# Patient Record
Sex: FEMALE | Race: WHITE | NOT HISPANIC OR LATINO | Employment: OTHER | URBAN - METROPOLITAN AREA
[De-identification: names, ages, dates, MRNs, and addresses within clinical notes are randomized per-mention and may not be internally consistent; named-entity substitution may affect disease eponyms.]

---

## 2017-03-09 ENCOUNTER — COMMUNICATION - HEALTHEAST (OUTPATIENT)
Dept: FAMILY MEDICINE | Facility: CLINIC | Age: 62
End: 2017-03-09

## 2017-03-09 DIAGNOSIS — J30.9 ALLERGIC RHINITIS: ICD-10-CM

## 2017-05-10 ENCOUNTER — HOSPITAL ENCOUNTER (OUTPATIENT)
Dept: CT IMAGING | Facility: CLINIC | Age: 62
Discharge: HOME | End: 2017-05-10
Attending: FAMILY MEDICINE

## 2017-05-10 ENCOUNTER — OFFICE VISIT - HEALTHEAST (OUTPATIENT)
Dept: FAMILY MEDICINE | Facility: CLINIC | Age: 62
End: 2017-05-10

## 2017-05-10 DIAGNOSIS — R10.9 ABDOMINAL PAIN: ICD-10-CM

## 2017-06-26 ENCOUNTER — COMMUNICATION - HEALTHEAST (OUTPATIENT)
Dept: FAMILY MEDICINE | Facility: CLINIC | Age: 62
End: 2017-06-26

## 2017-06-26 DIAGNOSIS — J30.9 ALLERGIC RHINITIS: ICD-10-CM

## 2017-10-03 ENCOUNTER — COMMUNICATION - HEALTHEAST (OUTPATIENT)
Dept: FAMILY MEDICINE | Facility: CLINIC | Age: 62
End: 2017-10-03

## 2017-10-04 ENCOUNTER — AMBULATORY - HEALTHEAST (OUTPATIENT)
Dept: FAMILY MEDICINE | Facility: CLINIC | Age: 62
End: 2017-10-04

## 2017-12-18 ENCOUNTER — OFFICE VISIT - HEALTHEAST (OUTPATIENT)
Dept: FAMILY MEDICINE | Facility: CLINIC | Age: 62
End: 2017-12-18

## 2017-12-18 ENCOUNTER — AMBULATORY - HEALTHEAST (OUTPATIENT)
Dept: FAMILY MEDICINE | Facility: CLINIC | Age: 62
End: 2017-12-18

## 2017-12-18 DIAGNOSIS — Z12.31 VISIT FOR SCREENING MAMMOGRAM: ICD-10-CM

## 2017-12-18 DIAGNOSIS — Z00.00 ROUTINE GENERAL MEDICAL EXAMINATION AT A HEALTH CARE FACILITY: ICD-10-CM

## 2017-12-18 LAB
CHOLEST SERPL-MCNC: 241 MG/DL
FASTING STATUS PATIENT QL REPORTED: YES
HDLC SERPL-MCNC: 73 MG/DL
LDLC SERPL CALC-MCNC: 154 MG/DL
TRIGL SERPL-MCNC: 72 MG/DL

## 2017-12-18 ASSESSMENT — MIFFLIN-ST. JEOR: SCORE: 1058.19

## 2018-02-28 ENCOUNTER — RECORDS - HEALTHEAST (OUTPATIENT)
Dept: ADMINISTRATIVE | Facility: OTHER | Age: 63
End: 2018-02-28

## 2018-02-28 ENCOUNTER — RECORDS - HEALTHEAST (OUTPATIENT)
Dept: BONE DENSITY | Facility: CLINIC | Age: 63
End: 2018-02-28

## 2018-02-28 ENCOUNTER — HOSPITAL ENCOUNTER (OUTPATIENT)
Dept: MAMMOGRAPHY | Facility: CLINIC | Age: 63
Discharge: HOME | End: 2018-02-28
Attending: FAMILY MEDICINE

## 2018-02-28 DIAGNOSIS — Z12.31 VISIT FOR SCREENING MAMMOGRAM: ICD-10-CM

## 2018-02-28 DIAGNOSIS — Z00.00 ENCOUNTER FOR GENERAL ADULT MEDICAL EXAMINATION WITHOUT ABNORMAL FINDINGS: ICD-10-CM

## 2018-03-07 ENCOUNTER — AMBULATORY - HEALTHEAST (OUTPATIENT)
Dept: FAMILY MEDICINE | Facility: CLINIC | Age: 63
End: 2018-03-07

## 2018-03-07 DIAGNOSIS — M81.0 OSTEOPOROSIS: ICD-10-CM

## 2018-03-08 ENCOUNTER — COMMUNICATION - HEALTHEAST (OUTPATIENT)
Dept: FAMILY MEDICINE | Facility: CLINIC | Age: 63
End: 2018-03-08

## 2018-03-09 ENCOUNTER — COMMUNICATION - HEALTHEAST (OUTPATIENT)
Dept: FAMILY MEDICINE | Facility: CLINIC | Age: 63
End: 2018-03-09

## 2018-04-24 ENCOUNTER — COMMUNICATION - HEALTHEAST (OUTPATIENT)
Dept: FAMILY MEDICINE | Facility: CLINIC | Age: 63
End: 2018-04-24

## 2018-04-24 DIAGNOSIS — J30.9 ALLERGIC RHINITIS: ICD-10-CM

## 2018-04-24 RX ORDER — OLOPATADINE HYDROCHLORIDE 2 MG/ML
SOLUTION OPHTHALMIC
Qty: 2.5 ML | Refills: 6 | Status: SHIPPED | OUTPATIENT
Start: 2018-04-24

## 2018-04-27 ENCOUNTER — COMMUNICATION - HEALTHEAST (OUTPATIENT)
Dept: FAMILY MEDICINE | Facility: CLINIC | Age: 63
End: 2018-04-27

## 2018-06-14 ENCOUNTER — OFFICE VISIT - HEALTHEAST (OUTPATIENT)
Dept: ENDOCRINOLOGY | Facility: CLINIC | Age: 63
End: 2018-06-14

## 2018-06-14 DIAGNOSIS — M81.0 SENILE OSTEOPOROSIS: ICD-10-CM

## 2018-06-14 LAB
CALCIUM SERPL-MCNC: 9.7 MG/DL (ref 8.5–10.5)
CREAT SERPL-MCNC: 0.71 MG/DL (ref 0.6–1.1)
GFR SERPL CREATININE-BSD FRML MDRD: >60 ML/MIN/1.73M2
POTASSIUM BLD-SCNC: 4 MMOL/L (ref 3.5–5)
PTH-INTACT SERPL-MCNC: 50 PG/ML (ref 10–86)
T4 FREE SERPL-MCNC: 0.9 NG/DL (ref 0.7–1.8)
TSH SERPL DL<=0.005 MIU/L-ACNC: 1.55 UIU/ML (ref 0.3–5)

## 2018-06-14 ASSESSMENT — MIFFLIN-ST. JEOR: SCORE: 1088.13

## 2018-06-15 LAB
25(OH)D3 SERPL-MCNC: 65.1 NG/ML (ref 30–80)
25(OH)D3 SERPL-MCNC: 65.1 NG/ML (ref 30–80)

## 2018-06-18 ENCOUNTER — COMMUNICATION - HEALTHEAST (OUTPATIENT)
Dept: FAMILY MEDICINE | Facility: CLINIC | Age: 63
End: 2018-06-18

## 2018-06-18 DIAGNOSIS — J30.9 ALLERGIC RHINITIS: ICD-10-CM

## 2018-06-20 RX ORDER — OLOPATADINE HYDROCHLORIDE 1 MG/ML
SOLUTION/ DROPS OPHTHALMIC
Qty: 15 ML | Refills: 0 | Status: SHIPPED | OUTPATIENT
Start: 2018-06-20

## 2018-08-01 ENCOUNTER — OFFICE VISIT - HEALTHEAST (OUTPATIENT)
Dept: FAMILY MEDICINE | Facility: CLINIC | Age: 63
End: 2018-08-01

## 2018-08-01 DIAGNOSIS — L72.9 CYST OF SKIN: ICD-10-CM

## 2020-01-30 ENCOUNTER — RECORDS - HEALTHEAST (OUTPATIENT)
Dept: ADMINISTRATIVE | Facility: OTHER | Age: 65
End: 2020-01-30

## 2020-07-13 ENCOUNTER — RECORDS - HEALTHEAST (OUTPATIENT)
Dept: BONE DENSITY | Facility: CLINIC | Age: 65
End: 2020-07-13

## 2020-07-13 ENCOUNTER — RECORDS - HEALTHEAST (OUTPATIENT)
Dept: ADMINISTRATIVE | Facility: OTHER | Age: 65
End: 2020-07-13

## 2020-07-13 DIAGNOSIS — Z78.0 ASYMPTOMATIC MENOPAUSAL STATE: ICD-10-CM

## 2020-07-13 DIAGNOSIS — Z13.820 ENCOUNTER FOR SCREENING FOR OSTEOPOROSIS: ICD-10-CM

## 2021-05-25 ENCOUNTER — RECORDS - HEALTHEAST (OUTPATIENT)
Dept: ADMINISTRATIVE | Facility: CLINIC | Age: 66
End: 2021-05-25

## 2021-05-26 ENCOUNTER — RECORDS - HEALTHEAST (OUTPATIENT)
Dept: ADMINISTRATIVE | Facility: CLINIC | Age: 66
End: 2021-05-26

## 2021-05-28 ENCOUNTER — RECORDS - HEALTHEAST (OUTPATIENT)
Dept: ADMINISTRATIVE | Facility: CLINIC | Age: 66
End: 2021-05-28

## 2021-05-29 ENCOUNTER — RECORDS - HEALTHEAST (OUTPATIENT)
Dept: ADMINISTRATIVE | Facility: CLINIC | Age: 66
End: 2021-05-29

## 2021-05-30 ENCOUNTER — RECORDS - HEALTHEAST (OUTPATIENT)
Dept: ADMINISTRATIVE | Facility: CLINIC | Age: 66
End: 2021-05-30

## 2021-05-31 VITALS — WEIGHT: 113.4 LBS | HEIGHT: 66 IN | BODY MASS INDEX: 18.23 KG/M2

## 2021-05-31 VITALS — BODY MASS INDEX: 20.02 KG/M2 | WEIGHT: 121.25 LBS

## 2021-06-01 VITALS — BODY MASS INDEX: 19.29 KG/M2 | WEIGHT: 120 LBS | HEIGHT: 66 IN

## 2021-06-01 VITALS — WEIGHT: 119 LBS | BODY MASS INDEX: 19.5 KG/M2

## 2021-06-10 NOTE — PROGRESS NOTES
61-year-old female presents with a dull constant pain of the right lower quadrant and right flank for 1 week.  She was seen at the [facility] clinic with a negative urine culture but noted to have trace blood in her urine.  Patient nonetheless completed 6 doses of Bactrim.  I discussed with the patient about differential diagnosis including nephrolithiasis, ovarian pathology, intestinal pathology, gynecologic pathology, and muscle skeletal.  I will obtain a CT scan.  I will repeat a urinalysis.  I will communicate the results to the patient in further management would depend on the results.  Patient verbalized understanding and agreed with the plan    ASSESSMENT/PLAN:  1. Abdominal pain  - Urinalysis  - CT Abdomen Pelvis Without Oral With Without IV Contrast; Future      Orders Placed This Encounter   Procedures     CT Abdomen Pelvis Without Oral With Without IV Contrast     Standing Status:   Future     Standing Expiration Date:   5/10/2018     Order Specific Question:   Can the procedure be changed per Radiologist protocol?     Answer:   Yes     Urinalysis           CHIEF COMPLAINT:  Chief Complaint   Patient presents with     Back Pain     lower back. Pt was in [facility] 5/6/17 did urine test     Groin Pain     lower right side       HISTORY OF PRESENT ILLNESS:  Yamilka is a 62 y.o. female presenting to the clinic today for back and abdominal pain. On 5/6/2017, she was seen a the [facility] for concerns for a UTI, and was prescribed a 3 day course of Bactrim. She finished them, but her urine culture was negative. She rates her pain at a 4/10. It has not limited her activities. She has pain in her right lower quadrant/right pelvic area and her right back and flank area. The pain is constant, and if she lays a certain way, she feels more pain. Her pain is dull and achy in quality. Denies any dysuria, burning with urination, urgency or frequency. She has not noticed any hematuria, but there was a trace of blood  in her urine at the [facility]. The groin pain and back happen simultaneously. She has never had nephrolithiasis before. She feels her pain has been unchanged since onset, not getting better or worse. Only surgery to the pelvic area was a tubal ligation. Her last colonoscopy was 3 years ago. She did have some rectal bleeding 2 weeks post her last colonoscopy, and was hospitalized for this. Notes some symptoms of vertigo in the past few months, but does not feel like this is connected to the pain.       REVIEW OF SYSTEMS:   No bowel changes, no vaginal concerns. No upper abdominal pain, chest pain, shortness of breath, nausea, vomiting, heartburn. Denies rash.  All other systems are negative.    PFSH:  Surgical history includes tubal ligation.     TOBACCO USE:  History   Smoking Status     Former Smoker   Smokeless Tobacco     Never Used       VITALS:  Vitals:    05/10/17 0955   BP: 100/70   Patient Site: Left Arm   Patient Position: Sitting   Cuff Size: Adult Regular   Pulse: 64   Weight: 121 lb 4 oz (55 kg)     Wt Readings from Last 3 Encounters:   05/10/17 121 lb 4 oz (55 kg)   12/13/16 126 lb 11.2 oz (57.5 kg)   04/28/16 125 lb 6.4 oz (56.9 kg)       PHYSICAL EXAM:  Constitutional: Patient is oriented to person, place, and time. Patient appears well-developed and well-nourished. No distress.   Head: Normocephalic and atraumatic.   Abdominal: Soft. Bowel sounds are normal. Patient exhibits no distension and no mass. There is no rebound and no guarding. Tenderness to palpation to the right lower quadrant.   Neurological: Patient is alert and oriented to person, place, and time. Patient has normal reflexes. No cranial nerve deficit. Coordination normal.   Skin: Skin is warm and dry. No rash noted. Patient is not diaphoretic. No erythema. No pallor.  Back: No tenderness to percussion of CVA.   Musculoskeletal: Good flexion and extension of hip and knee. Good internal and external rotation of right hip.    Results  for orders placed or performed in visit on 12/13/16   [facility] [place]   Result Value Ref Range    Cholesterol 240 (H) <=199 mg/dL    Triglycerides 58 <=149 mg/dL    HDL Cholesterol 73 >=50 mg/dL    LDL Calculated 155 (H) <=129 mg/dL    Patient Fasting > 8hrs? Yes    HM2(CBC w/o Differential)   Result Value Ref Range    WBC 5.7 4.0 - 11.0 thou/uL    RBC 4.77 3.80 - 5.40 mill/uL    Hemoglobin 14.7 12.0 - 16.0 g/dL    Hematocrit 42.5 35.0 - 47.0 %    MCV 89 80 - 100 fL    MCH 30.9 27.0 - 34.0 pg    MCHC 34.6 32.0 - 36.0 g/dL    RDW 12.0 11.0 - 14.5 %    Platelets 243 140 - 440 thou/uL    MPV 8.8 7.0 - 10.0 fL   Glucose   Result Value Ref Range    Glucose 86 70 - 99 mg/dL    Patient Fasting > 8hrs? Yes            ADDITIONAL HISTORY SUMMARIZED (2): None.  DECISION TO OBTAIN EXTRA INFORMATION (1): [facility] for [facility].   RADIOLOGY TESTS (1): Ordered abdominal CT.  LABS (1): Ordered labs today.  MEDICINE TESTS (1): None.  INDEPENDENT REVIEW (2 each): None.     IAlexia, am scribing for and in the presence of, Dr. Watson.    IDr. Watson, personally performed the services described in this documentation, as scribed by Alexia Lyon in my presence, and it is both accurate and complete.    MEDICATIONS:  Current Outpatient Prescriptions   Medication Sig Dispense Refill     CALCIUM CARBONATE/VITAMIN D3 (CALCIUM+D ORAL) Take 1 tablet by mouth daily.       cetirizine (ZYRTEC) 10 MG tablet Take 10 mg by mouth daily.       cholecalciferol, vitamin D3, (CHOLECALCIFEROL) 1,000 unit tablet Take 5,000 Units by mouth daily.        fluticasone (FLONASE) 50 mcg/actuation nasal spray USE ONE SPRAY IN EACH NOSTRIL ONE TIME DAILY 16 g 2     olopatadine (PATADAY) 0.2 % Drop Administer 1 drop to both eyes daily. 1 Bottle 0     acyclovir (ZOVIRAX) 400 MG tablet Take 1 tablet (400 mg total) by mouth 3 (three) times a day. 40 tablet 0     mometasone (NASONEX) 50 mcg/actuation nasal spray USE ONE SPRAY IN EACH NOSTRIL ONE  TIME DAILY  17 g 0     No current facility-administered medications for this visit.        Total data points: 3

## 2021-06-14 NOTE — PROGRESS NOTES
Assessment/Plan:        Diagnoses and all orders for this visit:    Routine general medical examination at a health care facility  -     [facility](CBC w/o Differential)  -     Glucose  -     [facility] [place]    Visit for screening mammogram  -     Mammo Screening Bilateral; Future; Expected date: 12/18/17        She is fasting.  We will do labs.  Consider mammogram, DEXA scan.  Continue with healthy food choices, limit alcohol intake.  Monitor her weight and if she has a significant weight loss in a short period of time, to have it evaluated.  To provide her influenza vaccine.  She will monitor her thirst and will try to have a humidifier in place.  If worse, she plans to have it evaluated.  Encourage annual physical.  She was agreeable with the plans.  Subjective:    Patient ID: Yamilka Villa is a 62 y.o. female.    STAN Prather is here for her physical.  She is doing well overall.  She is busy taking care of her 7-month-old grandson.  She notes increased thirst since the winter.  She wonders if this is because of the increase heat at her daughter's place.  She is staying with her daughter during the week.  Plans to consider using a humidifier and if worse, she would have it checked.      She had an abnormal Pap smear years before and another one in 2013, ASCUS, negative HPV.  Last Pap smear in December 2015, normal with negative HPV.  Last mammogram in October 2015, normal.  Colonoscopy February 2014 and recheck in 5 years.  No recent fractures.  She lost around 13 pounds since after her last physical.  It was a gradual loss.  She was not aware of it until she was weighed today.  She does not feel the difference.  Clothing fits the same.    Review of Systems  As above otherwise negative.    Past medical history, surgery and family history reviewed and as above.  Father with renal cancer in his 50s.  Maternal grandfather had MI at 49.  Paternal grandfather with MI.    Social history: Denies any issues with smoking.   "6-7 glasses of wine per week.  She has not been able to exercise regularly at this time as she is taking care of her 7-month-old grandson.  She does go up and down the steps quite a lot.  She plans to have a more regular exercise once her schedules are better, usually does walking and weights 3-4 times a week.  She also goes dancing with her friends every couple of weeks.  She eats healthy for the most part.        Objective:    Physical Exam  BP 96/60 (Patient Site: Left Arm, Patient Position: Sitting, Cuff Size: Adult Regular)  Ht 5' 5.5\" (1.664 m)  Wt 113 lb 6.4 oz (51.4 kg)  Breastfeeding? No  BMI 18.58 kg/m2    Vital signs noted above. AAO ×3.  HEENT negative.  Neck: Supple neck, nonpalpable cervical lymph nodes. No thyromegaly. Lungs: Clear to auscultation bilateral.  Heart: S1-S2 regular rate and rhythm, no murmurs were noted.  Abdomen: Flat, soft with bowel sounds and nontender.  Extremities: No edema, pulses were full and equal. Breast exam: No nipple bleeding or discharge, no mass or tenderness, no axillary lymphadenopathy.  Pelvic exam: Negative CMT, no adnexal mass or tenderness.          "

## 2021-06-16 PROBLEM — M81.0 SENILE OSTEOPOROSIS: Status: ACTIVE | Noted: 2018-06-14

## 2021-06-18 NOTE — PROGRESS NOTES
Progress Note    Reason for Visit:  Chief Complaint     Osteoporosis          Progress Note:    HPI:   This patient is seen in consultation at the request of the primary care physician because of osteoporosis.    Thank you for referring this pleasant 63-year-old female patient who has known about osteopenia for several years.    The patient had a repeat bone DEXA scan which showed that she has osteoporosis.    Her T score at the spine is -0.4 at the left and right hip -2.5.    The bone DEXA scan has been stable since 2014.    The patient is denying any previous fractures family history her mother probably had osteoporosis.    The patient does not smoke drinks rarely and has 4 children.    She takes calcium 2 tablets a day +5000 units daily of vitamin D.    She has some acid reflux.  Creatinine is normal at 0.7.    The patient has a said he is denying any previous fractures.      Component      Latest Ref Rng & Units 12/13/2016 5/10/2017 12/18/2017   CO2      22 - 31 mmol/L      Chloride      98 - 107 mmol/L      Anion Gap, Calculation      5 - 18 mmol/L      BUN      8 - 22 mg/dL      Creatinine      0.60 - 1.10 mg/dL      GFR MDRD Non Af Amer      >60 ml/min/1.73m2      GFR MDRD Af Amer      >60 ml/min/1.73m2      Glucose      70 - 125 mg/dL      Calcium      8.5 - 10.5 mg/dL      Cholesterol      <=199 mg/dL 240 (H)  241 (H)   Triglycerides      <=149 mg/dL 58  72   HDL Cholesterol      >=50 mg/dL 73  73   LDL Calculated      <=129 mg/dL 155 (H)  154 (H)   Patient Fasting > 8hrs?       Yes  Yes   Vitamin D, Total (25-Hydroxy)      30.0 - 80.0 ng/mL      POC Creatinine      mg/dL  0.7      Patient Profile:  62 y.o. female, postmenopausal, is here for the follow up bone density test.   History of fractures - None. Family history of osteoporosis - None.  Family history of hip fracture: None. Smoking history - No. Osteoporosis treatment past -  No. Osteoporosis treatment current - No.  Chronic medical problems - None.  High risk medications -  None.        Assessment:     1. The spine bone density L1-L2 with T-score -0.4, stable compared to 2014.  2. Femoral bone densities show left femoral neck T- score -2.5 and right femoral neck T-score -2.5, stable compared to 2014.  3. Trabecular bone score indicates moderate trabecular bone architecture.        62 y.o. female with OSTEOPOROSIS and HIGH fracture risk.    Review of Systems:    Nervous System: No headache, dizziness, fainting or memory loss. No tingling sensation of hand or feet.  Ears: No hearing loss or ringing in the ears  Eyes: No blurring of vision, redness, itching or dryness.  Nose: No nosebleed or loss of smell  Mouth: No mouth sores or loss of taste  Throat: No hoarseness or difficulty swallowing  Neck: No enlarged thyroid or lymph nodes.  Heart: No chest pain, palpitation or irregular heartbeat. No swelling of hands or feet  Lungs: No shortness of breath, cough, night sweats, wheezing or hemoptysis.  Gastrointestinal: No nausea or vomiting, constipation or diarrhea.  No acid reflux, abdominal pain or blood in stools.  Kidney/Bladdr: No polyuria, polydipsia, nocturia or hematuria.  Genital/Sexual: No loss of libido  Skin: No rash, hair loss or hirsutism.  No abnormal striae  Muscles/Joints/Bones: No morning stiffness, muscle aches and pain or loss of height.    Current Medications:  Current Outpatient Prescriptions   Medication Sig     acyclovir (ZOVIRAX) 400 MG tablet Take 1 tablet (400 mg total) by mouth 3 (three) times a day.     CALCIUM CARBONATE/VITAMIN D3 (CALCIUM+D ORAL) Take 1 tablet by mouth daily.     cetirizine (ZYRTEC) 10 MG tablet Take 10 mg by mouth daily.     cholecalciferol, vitamin D3, (CHOLECALCIFEROL) 1,000 unit tablet Take 5,000 Units by mouth daily.      fluticasone (FLONASE) 50 mcg/actuation nasal spray USE ONE SPRAY IN EACH NOSTRIL ONE TIME DAILY     PATADAY 0.2 % Drop INSTILL ONE DROP INTO EACH EYE ONCE DAILY     VITAMIN K2 ORAL Take 1 tablet by  "mouth daily.       Patients Active Problems:  Patient Active Problem List   Diagnosis     Benign Adenoma Of The Large Intestine     Peripheral Vertigo     Osteopenia     Menopause Has Occurred     Bright Red Blood Per Rectum     Anemia     Allergic Rhinitis     Vitamin D Deficiency     Herpes Simplex Type II     Hyperlipidemia       History:   reports that she has quit smoking. She has never used smokeless tobacco.   reports that she has quit smoking. She has never used smokeless tobacco. Her alcohol and drug histories are not on file.  History   Smoking Status     Former Smoker   Smokeless Tobacco     Never Used      reports that she has quit smoking. She has never used smokeless tobacco. Her alcohol and drug histories are not on file.  History   Sexual Activity     Sexual activity: Not on file     No past medical history on file.  Family History   Problem Relation Age of Onset     Multiple sclerosis Brother      Heart attack Brother      Melanoma Brother      Dementia Mother      Parkinsonism Mother      Melanoma Brother      Brain cancer Father      Heart attack Father      Kidney cancer Father      Other Sister      Wegeners     Fibromyalgia Sister      Arthritis Sister      Heart attack Maternal Grandmother      Heart attack Maternal Grandfather 49     Heart attack Paternal Grandfather      Brain cancer Paternal Aunt      x2     Brain cancer Paternal Uncle      No past medical history on file.  Past Surgical History:   Procedure Laterality Date     OTHER SURGICAL HISTORY      vocal cord surgery     [place] CORRJ HALLUX VALGUS W/SESMDC W/2 OSTEOT      Description: Hallux Valgus (Bunion) Correction;  Recorded: 09/24/2008;  Comments: Right.     LA LIGATE FALLOPIAN TUBE      Description: Tubal Ligation;  Recorded: 09/24/2008;     [place] TRACH REVISION,SIMPLE      Description: Tracheostoma Revision;  Recorded: 09/24/2008;       Vitals   height is 5' 5.5\" (1.664 m) and weight is 120 lb (54.4 kg). Her blood pressure is 104/64. "         Exam  General appearance: The patient looked well, not in acute distress.  Eyes: no evidence of thyroid eye disease.   Retinal exam: No evidence of diabetic retinopathy.  Mouth and Throat: Normal  Neck: No evidence of thyromegaly, enlarged lymph node or tenderness  Chest: Trachea is central. Chest is clear to auscultation and percussion. Breat sounds are normal.  Cardiovascular exam: JVP is not raised. Heart sounds are normal, no murmurs or rub  Peripheral pulses are palpable.   Abdomen: No masses or tenderness.    Back: No vertebral tenderness or kyphosis.  Extremities: No evidence of leg edema.   Skin: Normal to touch.  No abnormal striae  Neurologic exam:  Visual fields are intact by confrontation, grossly intact. No evidence of peripheral neuropathy.  Detailed foot exam normal.        Diagnosis:  No diagnosis found.    Orders:   No orders of the defined types were placed in this encounter.        Assessment and Plan: Osteoporosis I have discussed the pathophysiology of the disease with the patient and I discussed with her the different options of treatment I did suggest that she may want to go on Fosamax but the patient declined that we discussed about side effects of each modality of treatment.    At this stage we discussed about bone health to continue with exercise as she exercises regularly.    We will check calcium and vitamin D levels today she has been taking 5000 units a day.    Vitamin D deficiency last vitamin D 2015 was 27.6 I did advise her we will check her thyroid and her vitamin D level may be advised him to go on 2000 units daily I did advise him to hold on the vitamin D for now.    Her total cholesterol was 241 triglycerides 72 HDL 73 .    Patient will return to clinic in 18 months with another bone DEXA scan and will decide on further management at this time.    I have reviewed and ordered clinical lab test    I have reviewed and ordered radiology tests.    I have reviewed and  ordered her medication as required.    I have reviewed her test results and advised with the performing physician.    I have reviewed the patient's old records.    I have reviewed and summarized the patient old records.    I did spend 60 minutes with the patient more than 50% was spent on counseling and managing her care.

## 2021-06-19 NOTE — PROGRESS NOTES
ASSESSMENT/PLAN:    Cyst of skin, right big toe  63-year-old female who presented today for a benign cyst on the right big toe.  This is asymptomatic.  I provided her with a self-adhesive coban wrap that she can use as she is planning to do a lot of walking during her travel to [place].  Otherwise, I recommend supportive cares and monitoring.  I do not recommend any incision and drainage.  The patient verbalized understanding and agreed with the plan    SUBJECTIVE:    Yamilka Villa is a 63 y.o. female who came in today for a cyst that she noted on the edge of her right big toe.  It has been present for 2 months.  She thinks it has gotten slightly bigger.  It is not painful, itchy, red, swollen, no has it had any drainage.  It does not affect her weightbearing activity.  It does not affect her wearing shoes.  She is planning to travel to [place] and do a lot of walking.  She has had bunions in the past.    Review of Systems (except those mentioned above)  Constitutional: Negative.   HENT: Negative.   Eyes: Negative.   Respiratory: Negative.   Cardiovascular: Negative.   Gastrointestinal: Negative.   Endocrine: Negative.   Genitourinary: Negative.   Musculoskeletal: Negative.   Skin: Negative.   Allergic/Immunologic: Negative.   Neurological: Negative.   Hematological: Negative.   Psychiatric/Behavioral: Negative.     Patient Active Problem List    Diagnosis Date Noted     Senile osteoporosis 06/14/2018     Benign Adenoma Of The Large Intestine      Peripheral Vertigo      Osteopenia      Menopause Has Occurred      Bright Red Blood Per Rectum      Anemia      Allergic Rhinitis      Vitamin D Deficiency      Herpes Simplex Type II      Hyperlipidemia      No Known Allergies  Current Outpatient Prescriptions   Medication Sig Dispense Refill     acyclovir (ZOVIRAX) 400 MG tablet Take 1 tablet (400 mg total) by mouth 3 (three) times a day. 40 tablet 0     CALCIUM CARBONATE/VITAMIN D3 (CALCIUM+D ORAL) Take 1 tablet by  mouth daily.       cetirizine (ZYRTEC) 10 MG tablet Take 10 mg by mouth daily.       cholecalciferol, vitamin D3, (CHOLECALCIFEROL) 1,000 unit tablet Take 5,000 Units by mouth daily.        fluticasone (FLONASE) 50 mcg/actuation nasal spray USE ONE SPRAY IN EACH NOSTRIL ONE TIME DAILY 16 g 2     olopatadine (PATANOL) 0.1 % ophthalmic solution INSTILL 1 DROP INTO EACH EYE ONCE DAILY 15 mL 0     VITAMIN K2 ORAL Take 1 tablet by mouth daily.       PATADAY 0.2 % Drop INSTILL ONE DROP INTO EACH EYE ONCE DAILY (Patient not taking: Reported on 8/1/2018) 2.5 mL 6     No current facility-administered medications for this visit.      No past medical history on file.  Past Surgical History:   Procedure Laterality Date     OTHER SURGICAL HISTORY      vocal cord surgery     [place] CORRJ HALLUX VALGUS W/SESMDC W/2 OSTEOT      Description: Hallux Valgus (Bunion) Correction;  Recorded: 09/24/2008;  Comments: Right.     [place] LIGATE FALLOPIAN TUBE      Description: Tubal Ligation;  Recorded: 09/24/2008;     [place] TRACH REVISION,SIMPLE      Description: Tracheostoma Revision;  Recorded: 09/24/2008;     Social History     Social History     Marital status:      Spouse name: N/A     Number of children: N/A     Years of education: N/A     Social History Main Topics     Smoking status: Former Smoker     Smokeless tobacco: Never Used     Alcohol use Not on file     Drug use: Not on file     Sexual activity: Not on file     Other Topics Concern     Not on file     Social History Narrative     Family History   Problem Relation Age of Onset     Multiple sclerosis Brother      Heart attack Brother      Melanoma Brother      Dementia Mother      Parkinsonism Mother      Melanoma Brother      Brain cancer Father      Heart attack Father      Kidney cancer Father      Other Sister      Wegeners     Fibromyalgia Sister      Arthritis Sister      Heart attack Maternal Grandmother      Heart attack Maternal Grandfather 49     Heart attack Paternal  Grandfather      Brain cancer Paternal Aunt      x2     Brain cancer Paternal Uncle          OBJECTIVE:    Vitals:    08/01/18 1108   BP: 104/68   Patient Site: Left Arm   Patient Position: Sitting   Cuff Size: Adult Regular   Pulse: 68   SpO2: 100%   Weight: 119 lb (54 kg)     Body mass index is 19.5 kg/(m^2).    Physical Exam:  Constitutional: Patient was oriented to person, place, and time. Patient appeared well-developed and well-nourished. No distress.   Right foot: A 5 mm epidermal fluid-filled cyst without any surrounding erythema, warmth to touch, or tenderness to touch.  The cyst has intact borders without any yield of fluid to touch

## 2021-06-27 ENCOUNTER — HEALTH MAINTENANCE LETTER (OUTPATIENT)
Age: 66
End: 2021-06-27

## 2021-07-21 ENCOUNTER — RECORDS - HEALTHEAST (OUTPATIENT)
Dept: ADMINISTRATIVE | Facility: CLINIC | Age: 66
End: 2021-07-21

## 2021-10-17 ENCOUNTER — HEALTH MAINTENANCE LETTER (OUTPATIENT)
Age: 66
End: 2021-10-17

## 2022-05-29 ENCOUNTER — HOSPITAL ENCOUNTER (EMERGENCY)
Facility: HOSPITAL | Age: 67
Discharge: HOME | End: 2022-05-30
Attending: EMERGENCY MEDICINE | Admitting: EMERGENCY MEDICINE
Payer: COMMERCIAL

## 2022-05-29 DIAGNOSIS — I10 EPISODE OF HYPERTENSION: ICD-10-CM

## 2022-05-29 LAB
BASOPHILS # BLD AUTO: 0 10E3/UL (ref 0–0.2)
BASOPHILS NFR BLD AUTO: 1 %
EOSINOPHIL # BLD AUTO: 0.1 10E3/UL (ref 0–0.7)
EOSINOPHIL NFR BLD AUTO: 1 %
ERYTHROCYTE [DISTWIDTH] IN BLOOD BY AUTOMATED COUNT: 13.2 % (ref 10–15)
HCT VFR BLD AUTO: 40 % (ref 35–47)
HGB BLD-MCNC: 13.4 G/DL (ref 11.7–15.7)
IMM GRANULOCYTES # BLD: 0 10E3/UL
IMM GRANULOCYTES NFR BLD: 0 %
LYMPHOCYTES # BLD AUTO: 2.5 10E3/UL (ref 0.8–5.3)
LYMPHOCYTES NFR BLD AUTO: 35 %
MCH RBC QN AUTO: 30.8 PG (ref 26.5–33)
MCHC RBC AUTO-ENTMCNC: 33.5 G/DL (ref 31.5–36.5)
MCV RBC AUTO: 92 FL (ref 78–100)
MONOCYTES # BLD AUTO: 0.5 10E3/UL (ref 0–1.3)
MONOCYTES NFR BLD AUTO: 7 %
NEUTROPHILS # BLD AUTO: 4.1 10E3/UL (ref 1.6–8.3)
NEUTROPHILS NFR BLD AUTO: 56 %
NRBC # BLD AUTO: 0 10E3/UL
NRBC BLD AUTO-RTO: 0 /100
PLATELET # BLD AUTO: 237 10E3/UL (ref 150–450)
RBC # BLD AUTO: 4.35 10E6/UL (ref 3.8–5.2)
WBC # BLD AUTO: 7.3 10E3/UL (ref 4–11)

## 2022-05-29 PROCEDURE — 85025 COMPLETE CBC W/AUTO DIFF WBC: CPT | Performed by: EMERGENCY MEDICINE

## 2022-05-29 PROCEDURE — 99285 EMERGENCY DEPT VISIT HI MDM: CPT

## 2022-05-29 PROCEDURE — 93005 ELECTROCARDIOGRAM TRACING: CPT | Performed by: EMERGENCY MEDICINE

## 2022-05-29 PROCEDURE — 36415 COLL VENOUS BLD VENIPUNCTURE: CPT | Performed by: EMERGENCY MEDICINE

## 2022-05-29 PROCEDURE — 82310 ASSAY OF CALCIUM: CPT | Performed by: EMERGENCY MEDICINE

## 2022-05-29 PROCEDURE — 84484 ASSAY OF TROPONIN QUANT: CPT | Performed by: EMERGENCY MEDICINE

## 2022-05-29 ASSESSMENT — ENCOUNTER SYMPTOMS
LIGHT-HEADEDNESS: 1
WEAKNESS: 0
SPEECH DIFFICULTY: 0
SHORTNESS OF BREATH: 0

## 2022-05-30 ENCOUNTER — APPOINTMENT (OUTPATIENT)
Dept: CT IMAGING | Facility: HOSPITAL | Age: 67
End: 2022-05-30
Attending: EMERGENCY MEDICINE
Payer: COMMERCIAL

## 2022-05-30 VITALS
TEMPERATURE: 97.7 F | BODY MASS INDEX: 18.16 KG/M2 | DIASTOLIC BLOOD PRESSURE: 70 MMHG | HEART RATE: 60 BPM | HEIGHT: 66 IN | WEIGHT: 113 LBS | OXYGEN SATURATION: 99 % | SYSTOLIC BLOOD PRESSURE: 126 MMHG | RESPIRATION RATE: 22 BRPM

## 2022-05-30 LAB
ANION GAP SERPL CALCULATED.3IONS-SCNC: 10 MMOL/L (ref 5–18)
ATRIAL RATE - MUSE: 59 BPM
BUN SERPL-MCNC: 9 MG/DL (ref 8–22)
CALCIUM SERPL-MCNC: 9.2 MG/DL (ref 8.5–10.5)
CHLORIDE BLD-SCNC: 105 MMOL/L (ref 98–107)
CO2 SERPL-SCNC: 25 MMOL/L (ref 22–31)
CREAT SERPL-MCNC: 0.71 MG/DL (ref 0.6–1.1)
DIASTOLIC BLOOD PRESSURE - MUSE: NORMAL MMHG
GFR SERPL CREATININE-BSD FRML MDRD: >90 ML/MIN/1.73M2
GLUCOSE BLD-MCNC: 99 MG/DL (ref 70–125)
INTERPRETATION ECG - MUSE: NORMAL
P AXIS - MUSE: 78 DEGREES
POTASSIUM BLD-SCNC: 3.9 MMOL/L (ref 3.5–5)
PR INTERVAL - MUSE: 128 MS
QRS DURATION - MUSE: 74 MS
QT - MUSE: 418 MS
QTC - MUSE: 413 MS
R AXIS - MUSE: 33 DEGREES
SODIUM SERPL-SCNC: 140 MMOL/L (ref 136–145)
SYSTOLIC BLOOD PRESSURE - MUSE: NORMAL MMHG
T AXIS - MUSE: 42 DEGREES
TROPONIN I SERPL-MCNC: <0.01 NG/ML (ref 0–0.29)
VENTRICULAR RATE- MUSE: 59 BPM

## 2022-05-30 PROCEDURE — 70496 CT ANGIOGRAPHY HEAD: CPT

## 2022-05-30 PROCEDURE — 250N000011 HC RX IP 250 OP 636: Performed by: EMERGENCY MEDICINE

## 2022-05-30 RX ORDER — IOPAMIDOL 755 MG/ML
75 INJECTION, SOLUTION INTRAVASCULAR ONCE
Status: COMPLETED | OUTPATIENT
Start: 2022-05-30 | End: 2022-05-30

## 2022-05-30 RX ADMIN — IOPAMIDOL 75 ML: 755 INJECTION, SOLUTION INTRAVENOUS at 00:34

## 2022-05-30 NOTE — ED PROVIDER NOTES
"  NAME: Yamilka Villa  AGE: 67 year old female  YOB: 1955  MRN: [id number]  EVALUATION DATE & TIME: 2022 10:43 PM    PCP: Daisy Herrera    ED PROVIDER: Elijah Saavedra M.D.    Chief Complaint   Patient presents with     \"Artery in neck bulging\"     FINAL IMPRESSION:  1. Episode of hypertension      MEDICAL DECISION MAKIN:01 PM I met with the patient, obtained history, performed an initial exam, and discussed options and plan for diagnostics and treatment here in the ED.  1:24 AM I rechecked and updated patient on results. We discussed the plan for discharge and the patient is agreeable. Reviewed supportive cares, symptomatic treatment, outpatient follow up, and reasons to return to the Emergency Department. Patient to be discharged by ED RN.      Patient was clinically assessed and consented to treatment. After assessment, medical decision making and workup were discussed with the patient. The patient was agreeable to plan for testing, workup, and treatment.  Pertinent Labs & Imaging studies reviewed. (See chart for details)     Yamilka Villa is a 67 year old female who presents with bulging artery in her neck.   Differential diagnosis includes but not limited to carotid aneurysm, hypertension, lymphadenopathy, hypertensive emergency.  Patient with feelings of a bulging artery in her neck.  She does have strong pulses in her carotids and may be slightly stronger on the left than the right but I do not appreciate any specific bulge.  Patient does show more prominent carotid when she turns her head to the side.  Is possible she may have a small aneurysm or history of hypertension is uncontrolled causing the vessel to [place].  I do not find any acute clinical finding on her at this time and patient does appear very anxious.  She does have prior history of possible borderline hypertension which could be contributing since she may have a carotid aneurysm though likely not " "causing any symptoms at this time.  After discussion with patient she was agreeable and will ultimately get imaging to fully rule this out.  Blood pressure came down on its own and labs showed normal CBC, metabolic panel, troponin.  CTA of the carotids showed no acute abnormality and otherwise normal vessels going all the way up to the head.  Patient reassured after this and again reiterated this is likely more prominent with the turning of her head as well as with anxiety feeling her vessels she is going to feel stronger pulse through the anxiety.  Patient comfortable with plan will be discharged to follow-up with her primary clinic for recheck of blood pressure as if she is truly borderline high blood pressure she may need to be started on medications.  She also is not taking her cholesterol medication and will need to be discussed with her primary doctor taking this or if she is concerned about taking it possibly changing or reducing the dose.    0 minutes of critical care time    MEDICATIONS GIVEN IN THE EMERGENCY:  Medications   iopamidol (ISOVUE-370) solution 75 mL (75 mLs Intravenous Given 5/30/22 0034)       NEW PRESCRIPTIONS STARTED AT TODAY'S ER VISIT:  Discharge Medication List as of 5/30/2022  1:24 AM             =================================================================    HPI    Patient information was obtained from: Patient    Use of : N/A       Yamilka Villa is a 67 year old female with a past medical history of hyperlipidemia, anemia, who presents to the ED via walk-in for the evaluation of bulging artery in neck.    Patient reports she was driving down the road tonight when she started feeling flushed. She states she touched the left-side of her neck and felt a \"bulging artery\" and was able to feel a pulse. Patient notes she touched the right-side of her neck, which felt different than the left. No chest pain, lightheadedness, or shortness of breath at that time. Patient " "returned home and checked her blood pressure, which was measured in the 150's systolic, which prompted her to be seen in the ED. Patient notes a history of anxiety and states that her symptoms could possibly be attributed to this. Upon ED arrival, she states that she felt \"woozy\" but states that this could also be from her feeling anxious as she noticed her blood pressure in triage was elevated as well. At present, patient states that now that she is lying down, she feels better and the vessel on the left-side of her neck is less prominent. Patient also reports she was diagnosed with borderline hypertension with the last couple visits with her primary physician, but notes this was attributed to her anxiety. Patient also notes she was diagnosed with high cholesterol last fall but has not started taking medications for this and been noncompliant with medications. Otherwise, she denies any current lightheadedness, chest pain, shortness of breath, vision changes, unilateral weakness, and speech changes. No other complaints at this time.    REVIEW OF SYSTEMS   Review of Systems   HENT:        Positive for \"bulging artery\" in left-side of neck   Eyes:        Negative for vision changes   Respiratory: Negative for shortness of breath.    Cardiovascular: Negative for chest pain.   Neurological: Positive for light-headedness (resolved). Negative for speech difficulty and weakness.   All other systems reviewed and are negative.     PAST MEDICAL HISTORY:  Medical history reviewed. Chart shows history of hyperlipidemia.    PAST SURGICAL HISTORY:  Past Surgical History:   Procedure Laterality Date     [facility] CORRECT BUNION,DOUBLE OSTEOTOMY      Description: Hallux Valgus (Bunion) Correction;  Recorded: 09/24/2008;  Comments: Right.     OTHER SURGICAL HISTORY      OTHER SURGICAL HISTORYvocal cord surgery     [facility] TRACH REVISION,SIMPLE      Description: Tracheostoma Revision;  Recorded: 09/24/2008;     [facility] LIGATE FALLOPIAN TUBE      " Description: Tubal Ligation;  Recorded: 09/24/2008;     CURRENT MEDICATIONS:    No current facility-administered medications for this encounter.    Current Outpatient Medications:      acyclovir (ZOVIRAX) 400 MG tablet, [ACYCLOVIR (ZOVIRAX) 400 MG TABLET] Take 1 tablet (400 mg total) by mouth 3 (three) times a day., Disp: 40 tablet, Rfl: 0     CALCIUM CARBONATE/VITAMIN D3 (CALCIUM+D ORAL), [CALCIUM CARBONATE/VITAMIN D3 (CALCIUM+D ORAL)] Take 1 tablet by mouth daily., Disp: , Rfl:      cetirizine (ZYRTEC) 10 MG tablet, [CETIRIZINE (ZYRTEC) 10 MG TABLET] Take 10 mg by mouth daily., Disp: , Rfl:      cholecalciferol, vitamin D3, (CHOLECALCIFEROL) 1,000 unit tablet, [CHOLECALCIFEROL, VITAMIN D3, (CHOLECALCIFEROL) 1,000 UNIT TABLET] Take 5,000 Units by mouth daily. , Disp: , Rfl:      fluticasone (FLONASE) 50 mcg/actuation nasal spray, [FLUTICASONE (FLONASE) 50 MCG/ACTUATION NASAL SPRAY] USE ONE SPRAY IN EACH NOSTRIL ONE TIME DAILY, Disp: 16 g, Rfl: 2     olopatadine (PATANOL) 0.1 % ophthalmic solution, [OLOPATADINE (PATANOL) 0.1 % OPHTHALMIC SOLUTION] INSTILL 1 DROP INTO EACH EYE ONCE DAILY, Disp: 15 mL, Rfl: 0     PATADAY 0.2 % Drop, [PATADAY 0.2 % DROP] INSTILL ONE DROP INTO EACH EYE ONCE DAILY, Disp: 2.5 mL, Rfl: 6     VITAMIN K2 ORAL, [VITAMIN K2 ORAL] Take 1 tablet by mouth daily., Disp: , Rfl:     ALLERGIES:  No Known Allergies    FAMILY HISTORY:  Family History   Problem Relation Age of Onset     Multiple Sclerosis Brother      Coronary Artery Disease Brother      Melanoma Brother      Dementia Mother      Parkinsonism Mother      Melanoma Brother      Brain Cancer Father      Coronary Artery Disease Father      Kidney Cancer Father      Other - See Comments Sister         Wegeners     Fibromyalgia Sister      Arthritis Sister      Coronary Artery Disease Maternal Grandmother      Coronary Artery Disease Maternal Grandfather 49.00     Coronary Artery Disease Paternal Grandfather      Brain Cancer Paternal Aunt   "       x2     Brain Cancer Paternal Uncle      SOCIAL HISTORY:   Social History     Socioeconomic History     Marital status:    Tobacco Use     Smoking status: Former Smoker     Smokeless tobacco: Never Used     PHYSICAL EXAM:    Vitals: /70   Pulse 60   Temp 97.7  F (36.5  C) (Oral)   Resp 22   Ht 1.676 m (5' 6\")   Wt 51.3 kg (113 lb)   SpO2 99%   BMI 18.24 kg/m     Physical Exam  Vitals and nursing note reviewed.   Constitutional:       General: She is not in acute distress.     Appearance: Normal appearance. She is normal weight. She is not ill-appearing or toxic-appearing.   HENT:      Head: Normocephalic.   Eyes:      Extraocular Movements: Extraocular movements intact.      Pupils: Pupils are equal, round, and reactive to light.   Neck:      Vascular: No carotid bruit.   Cardiovascular:      Rate and Rhythm: Normal rate and regular rhythm.      Pulses: Normal pulses.      Heart sounds: Normal heart sounds.   Pulmonary:      Effort: Pulmonary effort is normal. No respiratory distress.      Breath sounds: Normal breath sounds.   Musculoskeletal:         General: Normal range of motion.      Cervical back: Normal range of motion and neck supple. No rigidity or tenderness.      Right lower leg: No edema.      Left lower leg: No edema.   Skin:     General: Skin is warm and dry.      Capillary Refill: Capillary refill takes less than 2 seconds.      Coloration: Skin is not pale.   Neurological:      General: No focal deficit present.      Mental Status: She is alert and oriented to person, place, and time.      Cranial Nerves: No cranial nerve deficit.      Sensory: No sensory deficit.      Motor: No weakness.      Coordination: Coordination normal.   Psychiatric:         Attention and Perception: Attention normal.         Mood and Affect: Mood is anxious.        LAB:  All pertinent labs reviewed and interpreted.  Labs Ordered and Resulted from Time of ED Arrival to Time of ED Departure "   TROPONIN I - Normal       Result Value    Troponin I <0.01     BASIC METABOLIC PANEL - Normal    Sodium 140      Potassium 3.9      Chloride 105      Carbon Dioxide (CO2) 25      Anion Gap 10      Urea Nitrogen 9      Creatinine 0.71      Calcium 9.2      Glucose 99      GFR Estimate >90     CBC WITH PLATELETS AND DIFFERENTIAL    WBC Count 7.3      RBC Count 4.35      Hemoglobin 13.4      Hematocrit 40.0      MCV 92      MCH 30.8      MCHC 33.5      RDW 13.2      Platelet Count 237      % Neutrophils 56      % Lymphocytes 35      % Monocytes 7      % Eosinophils 1      % Basophils 1      % Immature Granulocytes 0      NRBCs per 100 WBC 0      Absolute Neutrophils 4.1      Absolute Lymphocytes 2.5      Absolute Monocytes 0.5      Absolute Eosinophils 0.1      Absolute Basophils 0.0      Absolute Immature Granulocytes 0.0      Absolute NRBCs 0.0       RADIOLOGY:  CTA Head Neck with Contrast   Final Result   IMPRESSION:    HEAD CT:   1.  Normal head CT.      HEAD CTA:    1.  Normal CTA [place].      NECK CTA:   1.  Normal neck CTA.        EKG:   Performed at: [facility] Emergency Department. 29-May-2022, 22:50:02  Impression: Sinus bradycardia, no signs of acute ST elevation ischemia or irregular rhythm.  Rate: 59 BPM  Rhythm: Sinus bradycardia  QRS Interval: 74 ms  QTc Interval: 413 ms  Comparison: Compared with ECG of 27-Feb-2014, 16:07:08.  I have independently reviewed and interpreted the EKG(s) documented above.     PROCEDURES:   Procedures     I, Va Patel, am serving as a scribe to document services personally performed by Dr. Elijah Saavedra  based on my observation and the provider's statements to me. I, Elijah Saavedra MD attest that Va Patel is acting in a scribe capacity, has observed my performance of the services and has documented them in accordance with my direction.      Elijah Saavedra M.D.  Emergency Medicine  [facility] Emergency Department      Quentin, Elijah Mayes MD  05/30/22 0607

## 2022-05-30 NOTE — ED TRIAGE NOTES
Pt was driving home when she noticed pulsating arteries on both sides on her neck. Pt states this is not normal for her. Pt states she then took her BP and it was high for her at 154/90. Pt is anxious

## 2022-07-24 ENCOUNTER — HEALTH MAINTENANCE LETTER (OUTPATIENT)
Age: 67
End: 2022-07-24

## 2022-10-02 ENCOUNTER — HEALTH MAINTENANCE LETTER (OUTPATIENT)
Age: 67
End: 2022-10-02

## 2023-08-12 ENCOUNTER — HEALTH MAINTENANCE LETTER (OUTPATIENT)
Age: 68
End: 2023-08-12

## 2024-10-05 ENCOUNTER — HEALTH MAINTENANCE LETTER (OUTPATIENT)
Age: 69
End: 2024-10-05